# Patient Record
Sex: FEMALE | Race: BLACK OR AFRICAN AMERICAN | NOT HISPANIC OR LATINO | URBAN - METROPOLITAN AREA
[De-identification: names, ages, dates, MRNs, and addresses within clinical notes are randomized per-mention and may not be internally consistent; named-entity substitution may affect disease eponyms.]

---

## 2024-10-30 ENCOUNTER — APPOINTMENT (RX ONLY)
Dept: URBAN - METROPOLITAN AREA CLINIC 288 | Facility: CLINIC | Age: 74
Setting detail: DERMATOLOGY
End: 2024-10-30

## 2024-10-30 DIAGNOSIS — L28.0 LICHEN SIMPLEX CHRONICUS: ICD-10-CM | Status: INADEQUATELY CONTROLLED

## 2024-10-30 DIAGNOSIS — L20.89 OTHER ATOPIC DERMATITIS: ICD-10-CM | Status: INADEQUATELY CONTROLLED

## 2024-10-30 PROCEDURE — ? PRESCRIPTION

## 2024-10-30 PROCEDURE — ? PRESCRIPTION MEDICATION MANAGEMENT

## 2024-10-30 PROCEDURE — ? ADDITIONAL NOTES

## 2024-10-30 PROCEDURE — 99204 OFFICE O/P NEW MOD 45 MIN: CPT

## 2024-10-30 PROCEDURE — ? COUNSELING

## 2024-10-30 RX ORDER — TACROLIMUS 1 MG/G
OINTMENT TOPICAL
Qty: 30 | Refills: 4 | Status: ERX | COMMUNITY
Start: 2024-10-30

## 2024-10-30 RX ADMIN — TACROLIMUS: 1 OINTMENT TOPICAL at 00:00

## 2024-10-30 ASSESSMENT — BSA ECZEMA: % BODY COVERED IN ECZEMA: 5

## 2024-10-30 ASSESSMENT — LOCATION DETAILED DESCRIPTION DERM
LOCATION DETAILED: RIGHT AREOLA
LOCATION DETAILED: LEFT AREOLA
LOCATION DETAILED: RIGHT PERIAREOLAR BREAST 7-8:00 REGION

## 2024-10-30 ASSESSMENT — LOCATION SIMPLE DESCRIPTION DERM
LOCATION SIMPLE: LEFT BREAST
LOCATION SIMPLE: RIGHT BREAST

## 2024-10-30 ASSESSMENT — LOCATION ZONE DERM: LOCATION ZONE: TRUNK

## 2024-10-30 ASSESSMENT — SEVERITY ASSESSMENT 2020: SEVERITY 2020: MODERATE

## 2024-10-30 ASSESSMENT — ITCH NUMERIC RATING SCALE: HOW SEVERE IS YOUR ITCHING?: 9

## 2024-10-30 NOTE — PROCEDURE: ADDITIONAL NOTES
Additional Notes: Continue to use clobetasol cream 2x daily for up to 2 weeks during flares. Start using tacrolimus (protopic) 2 times daily on days when not using clobetasol. \\n\\nClobetasol is a topical steroid. Risks of long term, uninterrupted use of topical steroids include skin thinning, discoloration, and development of telangiectasias. 
Render Risk Assessment In Note?: no
Detail Level: Simple

## 2024-10-30 NOTE — PROCEDURE: PRESCRIPTION MEDICATION MANAGEMENT
Modify Regimen: Moisturize daily
Continue Regimen: Clobetasol 0.05% cream apply to the bilateral breast twice daily x 2 weeks
Initiate Treatment: Tacrolimus 0.1% ointment
Detail Level: Zone
Render In Strict Bullet Format?: No

## 2025-07-16 ENCOUNTER — APPOINTMENT (OUTPATIENT)
Dept: URBAN - METROPOLITAN AREA CLINIC 288 | Facility: CLINIC | Age: 75
Setting detail: DERMATOLOGY
End: 2025-07-16

## 2025-07-16 DIAGNOSIS — D485 NEOPLASM OF UNCERTAIN BEHAVIOR OF SKIN: ICD-10-CM | Status: INADEQUATELY CONTROLLED

## 2025-07-16 DIAGNOSIS — L73.8 OTHER SPECIFIED FOLLICULAR DISORDERS: ICD-10-CM | Status: INADEQUATELY CONTROLLED

## 2025-07-16 PROBLEM — D48.5 NEOPLASM OF UNCERTAIN BEHAVIOR OF SKIN: Status: ACTIVE | Noted: 2025-07-16

## 2025-07-16 PROCEDURE — ? COUNSELING

## 2025-07-16 PROCEDURE — ? ADDITIONAL NOTES

## 2025-07-16 PROCEDURE — ? BIOPSY BY SHAVE METHOD

## 2025-07-16 ASSESSMENT — LOCATION SIMPLE DESCRIPTION DERM
LOCATION SIMPLE: RIGHT HAND
LOCATION SIMPLE: RIGHT BUTTOCK
LOCATION SIMPLE: RIGHT HAND
LOCATION SIMPLE: PERIANAL SKIN

## 2025-07-16 ASSESSMENT — LOCATION DETAILED DESCRIPTION DERM
LOCATION DETAILED: RIGHT THENAR EMINENCE
LOCATION DETAILED: RIGHT MEDIAL BUTTOCK
LOCATION DETAILED: PERIANAL SKIN
LOCATION DETAILED: RIGHT THENAR EMINENCE

## 2025-07-16 ASSESSMENT — LOCATION ZONE DERM
LOCATION ZONE: HAND
LOCATION ZONE: TRUNK
LOCATION ZONE: ANUS
LOCATION ZONE: HAND

## 2025-07-16 NOTE — PROCEDURE: ADDITIONAL NOTES
Detail Level: Simple
Render Risk Assessment In Note?: no
Additional Notes: Pt has topical steroid at home. Pt advised to use 2x daily for 2 weeks.

## 2025-07-16 NOTE — PROCEDURE: BIOPSY BY SHAVE METHOD
Detail Level: Detailed
Depth Of Biopsy: dermis
Was A Bandage Applied: Yes
Size Of Lesion In Cm: 0
Biopsy Type: H and E
Biopsy Method: Dermablade
Anesthesia Type: 1% lidocaine with epinephrine
Anesthesia Volume In Cc: 1.5
Hemostasis: Electrocautery
Wound Care: Petrolatum
Dressing: bandage
Destruction After The Procedure: No
Type Of Destruction Used: Curettage
Curettage Text: The wound bed was treated with curettage after the biopsy was performed.
Cryotherapy Text: The wound bed was treated with cryotherapy after the biopsy was performed.
Electrodesiccation Text: The wound bed was treated with electrodesiccation after the biopsy was performed.
Electrodesiccation And Curettage Text: The wound bed was treated with electrodesiccation and curettage after the biopsy was performed.
Silver Nitrate Text: The wound bed was treated with silver nitrate after the biopsy was performed.
Lab: [id number]
Lab Facility: [id number]
Medical Necessity Information: It is in your best interest to select a reason for this procedure from the list below. All of these items fulfill various CMS LCD requirements except the new and changing color options.
Consent: Verbal consent was obtained and risks were reviewed including but not limited to scarring, infection, bleeding, scabbing, incomplete removal, nerve damage and allergy to anesthesia.
Post-Care Instructions: I reviewed with the patient in detail post-care instructions. Patient is to keep the biopsy site dry overnight, and then apply bacitracin twice daily until healed. Patient may apply hydrogen peroxide soaks to remove any crusting.
Notification Instructions: Patient will be notified of biopsy results. However, patient instructed to call the office if not contacted within 2 weeks.
Billing Type: Third-Party Bill
Information: Selecting Yes will display possible errors in your note based on the variables you have selected. This validation is only offered as a suggestion for you. PLEASE NOTE THAT THE VALIDATION TEXT WILL BE REMOVED WHEN YOU FINALIZE YOUR NOTE. IF YOU WANT TO FAX A PRELIMINARY NOTE YOU WILL NEED TO TOGGLE THIS TO 'NO' IF YOU DO NOT WANT IT IN YOUR FAXED NOTE.

## 2025-07-21 ENCOUNTER — APPOINTMENT (OUTPATIENT)
Dept: URBAN - METROPOLITAN AREA CLINIC 288 | Facility: CLINIC | Age: 75
Setting detail: DERMATOLOGY
End: 2025-07-21

## 2025-07-21 DIAGNOSIS — Z48.817 ENCOUNTER FOR SURGICAL AFTERCARE FOLLOWING SURGERY ON THE SKIN AND SUBCUTANEOUS TISSUE: ICD-10-CM | Status: STABLE

## 2025-07-21 DIAGNOSIS — D485 NEOPLASM OF UNCERTAIN BEHAVIOR OF SKIN: ICD-10-CM | Status: INADEQUATELY CONTROLLED

## 2025-07-21 PROBLEM — D48.5 NEOPLASM OF UNCERTAIN BEHAVIOR OF SKIN: Status: ACTIVE | Noted: 2025-07-21

## 2025-07-21 PROCEDURE — ? ADDITIONAL NOTES

## 2025-07-21 PROCEDURE — ? PRESCRIPTION

## 2025-07-21 PROCEDURE — ? PRESCRIPTION MEDICATION MANAGEMENT

## 2025-07-21 PROCEDURE — ? COUNSELING

## 2025-07-21 RX ORDER — MUPIROCIN 20 MG/G
OINTMENT TOPICAL TWICE DAILY
Qty: 22 | Refills: 5 | Status: ERX | COMMUNITY
Start: 2025-07-21

## 2025-07-21 RX ADMIN — MUPIROCIN: 20 OINTMENT TOPICAL at 00:00

## 2025-07-21 ASSESSMENT — LOCATION DETAILED DESCRIPTION DERM
LOCATION DETAILED: RIGHT THENAR EMINENCE
LOCATION DETAILED: RIGHT THENAR EMINENCE

## 2025-07-21 ASSESSMENT — LOCATION SIMPLE DESCRIPTION DERM
LOCATION SIMPLE: RIGHT HAND
LOCATION SIMPLE: RIGHT HAND

## 2025-07-21 ASSESSMENT — LOCATION ZONE DERM
LOCATION ZONE: HAND
LOCATION ZONE: HAND

## 2025-07-21 NOTE — PROCEDURE: ADDITIONAL NOTES
Additional Notes: awaiting path. appears to be healing well. plan to start mupirocin ointment 2x daily for 1 week under bandages. Pt advised ot replace bandaid 2x daily. Plan to have her RTC in 1 week, may need silver nitrate tx for hype granulation tissue
Render Risk Assessment In Note?: no
Detail Level: Simple

## 2025-07-21 NOTE — PROCEDURE: PRESCRIPTION MEDICATION MANAGEMENT
Render In Strict Bullet Format?: No
Detail Level: Zone
Initiate Treatment: \\n- Mupirocin ointment 2x daily \\n- apply bandage 2x daily

## 2025-07-28 ENCOUNTER — APPOINTMENT (OUTPATIENT)
Dept: URBAN - METROPOLITAN AREA CLINIC 288 | Facility: CLINIC | Age: 75
Setting detail: DERMATOLOGY
End: 2025-07-28

## 2025-07-28 DIAGNOSIS — D485 NEOPLASM OF UNCERTAIN BEHAVIOR OF SKIN: ICD-10-CM | Status: RESOLVING

## 2025-07-28 PROBLEM — D48.5 NEOPLASM OF UNCERTAIN BEHAVIOR OF SKIN: Status: ACTIVE | Noted: 2025-07-28

## 2025-07-28 PROCEDURE — ? PRESCRIPTION MEDICATION MANAGEMENT

## 2025-07-28 PROCEDURE — ? COUNSELING

## 2025-07-28 PROCEDURE — ? ADDITIONAL NOTES

## 2025-07-28 ASSESSMENT — LOCATION DETAILED DESCRIPTION DERM
LOCATION DETAILED: RIGHT THENAR EMINENCE
LOCATION DETAILED: RIGHT THENAR EMINENCE

## 2025-07-28 ASSESSMENT — LOCATION ZONE DERM
LOCATION ZONE: HAND
LOCATION ZONE: HAND

## 2025-07-28 ASSESSMENT — LOCATION SIMPLE DESCRIPTION DERM
LOCATION SIMPLE: RIGHT HAND
LOCATION SIMPLE: RIGHT HAND

## 2025-07-28 NOTE — PROCEDURE: ADDITIONAL NOTES
Additional Notes: LV: awaiting path. appears to be healing well. plan to start mupirocin ointment 2x daily for 1 week under bandages. Pt advised ot replace bandaid 2x daily. Plan to have her RTC in 1 week, may need silver nitrate tx for hype granulation tissue\\n\\n7/28/25 : Path came back as inflamed infection. Plan to monitor for 4 weeks. Can do LN2 if it persists beyond 4 weeks. Continue mupirocin ointment 2x daily for 4 more weeks. Moisturize with Aquaphor a few times daily.
Render Risk Assessment In Note?: no
Detail Level: Simple

## 2025-07-28 NOTE — PROCEDURE: PRESCRIPTION MEDICATION MANAGEMENT
Continue Regimen: \\n- mupirocin ointment 2x daily
Detail Level: Zone
Render In Strict Bullet Format?: No